# Patient Record
Sex: MALE | Race: WHITE | Employment: OTHER | URBAN - NONMETROPOLITAN AREA
[De-identification: names, ages, dates, MRNs, and addresses within clinical notes are randomized per-mention and may not be internally consistent; named-entity substitution may affect disease eponyms.]

---

## 2024-08-26 ENCOUNTER — HOSPITAL ENCOUNTER (OUTPATIENT)
Dept: WOUND CARE | Age: 69
Discharge: HOME | End: 2024-08-26
Attending: NURSE PRACTITIONER
Payer: MEDICARE

## 2024-08-26 VITALS
OXYGEN SATURATION: 95 % | RESPIRATION RATE: 18 BRPM | SYSTOLIC BLOOD PRESSURE: 156 MMHG | HEART RATE: 96 BPM | TEMPERATURE: 98 F | DIASTOLIC BLOOD PRESSURE: 65 MMHG

## 2024-08-26 DIAGNOSIS — I87.2 LYMPHEDEMA DUE TO VENOUS INSUFFICIENCY: ICD-10-CM

## 2024-08-26 DIAGNOSIS — I89.0 LYMPHEDEMA DUE TO VENOUS INSUFFICIENCY: ICD-10-CM

## 2024-08-26 DIAGNOSIS — I83.891 VENOUS STASIS ULCER OF RIGHT LOWER LEG WITH EDEMA OF RIGHT LOWER LEG (HCC): Primary | ICD-10-CM

## 2024-08-26 DIAGNOSIS — R60.0 VENOUS STASIS ULCER OF RIGHT LOWER LEG WITH EDEMA OF RIGHT LOWER LEG (HCC): Primary | ICD-10-CM

## 2024-08-26 DIAGNOSIS — I83.019 VENOUS STASIS ULCER OF RIGHT LOWER LEG WITH EDEMA OF RIGHT LOWER LEG (HCC): Primary | ICD-10-CM

## 2024-08-26 DIAGNOSIS — L97.919 VENOUS STASIS ULCER OF RIGHT LOWER LEG WITH EDEMA OF RIGHT LOWER LEG (HCC): Primary | ICD-10-CM

## 2024-08-26 DIAGNOSIS — E11.620 TYPE 2 DIABETES MELLITUS WITH DIABETIC DERMATITIS, UNSPECIFIED WHETHER LONG TERM INSULIN USE (HCC): ICD-10-CM

## 2024-08-26 PROCEDURE — 99204 OFFICE O/P NEW MOD 45 MIN: CPT | Performed by: NURSE PRACTITIONER

## 2024-08-26 PROCEDURE — 99203 OFFICE O/P NEW LOW 30 MIN: CPT

## 2024-08-26 PROCEDURE — 29580 STRAPPING UNNA BOOT: CPT

## 2024-08-26 RX ORDER — GENTAMICIN SULFATE 1 MG/G
OINTMENT TOPICAL ONCE
OUTPATIENT
Start: 2024-08-26 | End: 2024-08-26

## 2024-08-26 RX ORDER — SILVER SULFADIAZINE 10 MG/G
CREAM TOPICAL ONCE
OUTPATIENT
Start: 2024-08-26 | End: 2024-08-26

## 2024-08-26 RX ORDER — ASPIRIN 81 MG/1
81 TABLET ORAL DAILY
COMMUNITY

## 2024-08-26 RX ORDER — LIDOCAINE 50 MG/G
OINTMENT TOPICAL ONCE
OUTPATIENT
Start: 2024-08-26 | End: 2024-08-26

## 2024-08-26 RX ORDER — TRIAMCINOLONE ACETONIDE 1 MG/G
OINTMENT TOPICAL ONCE
OUTPATIENT
Start: 2024-08-26 | End: 2024-08-26

## 2024-08-26 RX ORDER — LIDOCAINE HYDROCHLORIDE 40 MG/ML
SOLUTION TOPICAL ONCE
OUTPATIENT
Start: 2024-08-26 | End: 2024-08-26

## 2024-08-26 RX ORDER — BACITRACIN ZINC AND POLYMYXIN B SULFATE 500; 1000 [USP'U]/G; [USP'U]/G
OINTMENT TOPICAL ONCE
OUTPATIENT
Start: 2024-08-26 | End: 2024-08-26

## 2024-08-26 RX ORDER — LIDOCAINE HYDROCHLORIDE 20 MG/ML
JELLY TOPICAL ONCE
OUTPATIENT
Start: 2024-08-26 | End: 2024-08-26

## 2024-08-26 RX ORDER — LIDOCAINE 40 MG/G
CREAM TOPICAL ONCE
OUTPATIENT
Start: 2024-08-26 | End: 2024-08-26

## 2024-08-26 RX ORDER — SODIUM CHLOR/HYPOCHLOROUS ACID 0.033 %
SOLUTION, IRRIGATION IRRIGATION ONCE
OUTPATIENT
Start: 2024-08-26 | End: 2024-08-26

## 2024-08-26 RX ORDER — GINSENG 100 MG
CAPSULE ORAL ONCE
OUTPATIENT
Start: 2024-08-26 | End: 2024-08-26

## 2024-08-26 RX ORDER — CIPROFLOXACIN 500 MG/1
500 TABLET, FILM COATED ORAL 2 TIMES DAILY
COMMUNITY

## 2024-08-26 RX ORDER — CLOBETASOL PROPIONATE 0.5 MG/G
OINTMENT TOPICAL ONCE
OUTPATIENT
Start: 2024-08-26 | End: 2024-08-26

## 2024-08-26 RX ORDER — KRILL/OM-3/DHA/EPA/PHOSPHO/AST 500-110 MG
500 CAPSULE ORAL DAILY
COMMUNITY

## 2024-08-26 RX ORDER — M-VIT,TX,IRON,MINS/CALC/FOLIC 27MG-0.4MG
1 TABLET ORAL DAILY
COMMUNITY

## 2024-08-26 RX ORDER — PIOGLITAZONEHYDROCHLORIDE 45 MG/1
45 TABLET ORAL DAILY
COMMUNITY

## 2024-08-26 RX ORDER — ATORVASTATIN CALCIUM 80 MG/1
80 TABLET, FILM COATED ORAL DAILY
COMMUNITY

## 2024-08-26 RX ORDER — LISINOPRIL AND HYDROCHLOROTHIAZIDE 12.5; 2 MG/1; MG/1
2 TABLET ORAL DAILY
COMMUNITY

## 2024-08-26 RX ORDER — BETAMETHASONE DIPROPIONATE 0.5 MG/G
CREAM TOPICAL ONCE
OUTPATIENT
Start: 2024-08-26 | End: 2024-08-26

## 2024-08-26 RX ORDER — MUPIROCIN 20 MG/G
OINTMENT TOPICAL ONCE
OUTPATIENT
Start: 2024-08-26 | End: 2024-08-26

## 2024-08-26 RX ORDER — EZETIMIBE 10 MG/1
10 TABLET ORAL DAILY
COMMUNITY

## 2024-08-26 RX ORDER — NEOMYCIN/BACITRACIN/POLYMYXINB 3.5-400-5K
OINTMENT (GRAM) TOPICAL ONCE
OUTPATIENT
Start: 2024-08-26 | End: 2024-08-26

## 2024-08-26 RX ORDER — INSULIN GLARGINE 100 [IU]/ML
45 INJECTION, SOLUTION SUBCUTANEOUS 2 TIMES DAILY
COMMUNITY

## 2024-08-26 NOTE — PATIENT INSTRUCTIONS
Visit Discharge/Physician Orders:    Wound Location: Right leg     Dressing orders:     1) Gather wound care supplies and arrange on clean table.     2) Wash your hands with soap and water or use alcohol based hand  for 20 seconds (sing \"Happy Birthday\" twice).    3) Cleanse wounds with normal saline or wound cleanser and gauze. Pat dry with clean gauze.    4) Right leg- Remove old unna boots to right lower legs. Wash legs with warm soapy water. Pat dry. Cleanse wound with normal saline or wound cleanser and gauze. Pat dry with clean gauze.  Apply unna boots ABD then kerlix then coban to right lower legs from base of toes to 1-2 inches below bend of knee. Change in wound clinic weekly.     Keep all dressings clean & dry.    Follow up visit:   1 Week on Thursday September 5th at 2:00 pm     Keep next scheduled appointment. Please give 24 hour notice if unable to keep appointment. [phone]    If you experience any of the following, please call the Wound Care Service during business hours: Monday through Friday 8:00 am - 4:30 pm  ([phone]).   *Increase in pain   *Temperature over 101   *Increase in drainage from your wound or a foul odor   *Uncontrolled swelling   *Need for compression bandage changes due to slippage, breakthrough drainage    If you need medical attention outside of business hours, please contact your Primary Care Doctor or go to the nearest emergency room.

## 2024-08-26 NOTE — PROGRESS NOTES
Unna Boot Application   Below Knee    NAME:  Mendel Cedeno  YOB: 1955  MEDICAL RECORD NUMBER:  [id number]  DATE:  8/26/2024    Unna boot: Appied primary and secondary dressing as ordered.  Applied Unna roll from toes to knee overlapping each time.   Applied ace wrap or coban from toes to below the knee.   Secured with tape and/or metal clips covered with tape.   Instructed patient/caregiver to keep dressing dry and intact. DO NOT REMOVE DRESSING.   Instructed pt/family/caregiver to report excessive draining, loose bandage, wet dressing, severe pain or tingling in toes.  Applied Unna Boot dressing below the knee to right lower leg.    Unna Boot(s) were applied per  Guidelines.     Electronically signed by Gloria Proctor RN on 8/26/2024 at 3:34 PM

## 2024-08-26 NOTE — PLAN OF CARE
Problem: Wound:  Goal: Will show signs of wound healing; wound closure and no evidence of infection  Description: Will show signs of wound healing; wound closure and no evidence of infection  Outcome: Progressing     Patient presents to wound clinic for right leg wound. No s/s of infection noted. See AVS for discharge instructions. Follow up visit:   1 Week on Thursday September 5th at 2:00 pm     Care plan reviewed with patient and wife.  Patient and wife  verbalize understanding of the plan of care and contribute to goal setting.

## 2024-08-26 NOTE — PROGRESS NOTES
[facility]  Consult and Procedure Note      Mendel Cedeno  MEDICAL RECORD NUMBER:  [id number]  AGE: 69 y.o.   GENDER: male  : 1955  EPISODE DATE:  2024  Referring Provider: NELSON Shin - *   Reason for Referral: right leg wound    SUBJECTIVE:     Chief Complaint   Patient presents with    Wound Check     Right leg          HISTORY OF PRESENT ILLNESS      Mendel Cedeno is a 69 y.o. male who presents today for wound/ulcer evaluation. Patient is new to the wound center. Wound duration:  3 week(s).    Patient presents today for evaluation of right lower extremity wound.  Patient states that wound began approximately 3 weeks ago after he had been on vacation camping.  He states that wounds initially presented as blisters that then opened into draining wounds.  He has been following with PCP for this problem who has treated him with Doxycycline and Ciprofloxacin. He states that erythema and drainage have decreased with this. Edema and wounds continue.  He denies any adverse effects to use of Cipro which he continues on today.  He states that he was recently placed on diuretic which has helped with some of his edema.  He has never previously worn any type of compression stocking but states that his PCP has talked to him regarding the need for this.  He denies any further needs or concerns.    PAST MEDICAL HISTORY             Diagnosis Date    Diabetes mellitus (HCC)     Hypertension     Peripheral vascular disease (HCC)        PAST SURGICAL HISTORY     History reviewed. No pertinent surgical history.    FAMILY HISTORY     History reviewed. No pertinent family history.    SOCIAL HISTORY     Social History     Tobacco Use    Smoking status: Former     Types: Cigarettes   Substance Use Topics    Alcohol use: Not Currently    Drug use: Never       ALLERGIES     No Known Allergies    MEDICATIONS     Current Outpatient Medications on File Prior to Encounter   Medication Sig Dispense  visit on today's date.       Wounds and Treatment Plan:    Assessment & Plan  Venous stasis ulcer of right lower leg with edema of right lower leg (HCC)   Etiology of venous disease discussed with patient at today's visit.  Reviewed the importance of lifelong control of edema through compression, elevation of legs while at rest, and regular exercise in both healing of current wounds and prevention of reoccurrence of wounds in the future. Complications of nonhealing/not treated wounds include but are not limited to infection, pain, infection of bone, sepsis, loss of life and limb.      -Start use of unna boot to RLE to promote healing of wound as well as to provide good compression.  Advised patient to leave in place until follow up next week.  Do not get dressing wet.  Should dressing become wet gently remove and call clinic.      Type 2 diabetes mellitus with diabetic dermatitis, unspecified whether long term insulin use ([facility])  Education provided on the importance of good control of diabetes mellitus in wound healing and resolution/prevention of infection.  Last hemoglobin A1C unknown.  Encouraged patient to take medications as prescribed for good control. Recommend well balanced diet with adequate protein intake within any limits set by previous providers.        Lymphedema due to venous insufficiency  Etiology of lymphedema discussed with patient at today's visit.  Reviewed the importance of lifelong control of edema through daily use of compression, elevation of legs while at rest, and regular exercise in both healing of current wounds and prevention of reoccurrence of wounds in the future. Reviewed various forms of compression available.  Recommend obtaining compression stockings for long-term use.       Dressing application process discussed and demonstrated during visit today, written instructions provided.  Patient verbalized comfort with process.  All questions and concerns addressed prior to completion of

## 2024-09-05 ENCOUNTER — HOSPITAL ENCOUNTER (OUTPATIENT)
Dept: WOUND CARE | Age: 69
Discharge: HOME | End: 2024-09-05
Attending: NURSE PRACTITIONER
Payer: MEDICARE

## 2024-09-05 VITALS
SYSTOLIC BLOOD PRESSURE: 149 MMHG | DIASTOLIC BLOOD PRESSURE: 70 MMHG | HEART RATE: 96 BPM | RESPIRATION RATE: 18 BRPM | OXYGEN SATURATION: 97 % | TEMPERATURE: 98.3 F

## 2024-09-05 DIAGNOSIS — I87.2 LYMPHEDEMA DUE TO VENOUS INSUFFICIENCY: ICD-10-CM

## 2024-09-05 DIAGNOSIS — I83.019 VENOUS STASIS ULCER OF RIGHT LOWER LEG WITH EDEMA OF RIGHT LOWER LEG (HCC): Primary | ICD-10-CM

## 2024-09-05 DIAGNOSIS — R60.0 VENOUS STASIS ULCER OF RIGHT LOWER LEG WITH EDEMA OF RIGHT LOWER LEG (HCC): Primary | ICD-10-CM

## 2024-09-05 DIAGNOSIS — I83.891 VENOUS STASIS ULCER OF RIGHT LOWER LEG WITH EDEMA OF RIGHT LOWER LEG (HCC): Primary | ICD-10-CM

## 2024-09-05 DIAGNOSIS — L97.919 VENOUS STASIS ULCER OF RIGHT LOWER LEG WITH EDEMA OF RIGHT LOWER LEG (HCC): Primary | ICD-10-CM

## 2024-09-05 DIAGNOSIS — I89.0 LYMPHEDEMA DUE TO VENOUS INSUFFICIENCY: ICD-10-CM

## 2024-09-05 PROCEDURE — 99213 OFFICE O/P EST LOW 20 MIN: CPT | Performed by: NURSE PRACTITIONER

## 2024-09-05 PROCEDURE — 99212 OFFICE O/P EST SF 10 MIN: CPT

## 2024-09-05 NOTE — ASSESSMENT & PLAN NOTE
-Wound has healed.  Discontinue previous wound care orders.    -Education provided on the importance of long term use of compression to prevent recurrence.  Apply in the morning, remove prior to bed.  Patient declined application of tubigrip today.

## 2024-09-05 NOTE — PATIENT INSTRUCTIONS
Visit Discharge/Physician Orders:  - Measurements as follows: ankle: calf: leg length: 44cm   Wound Location: Right leg - healed     Keep all dressings clean & dry.    Follow up visit:  As needed. Call with any concerns.     Keep next scheduled appointment. Please give 24 hour notice if unable to keep appointment. [phone]    If you experience any of the following, please call the Wound Care Service during business hours: Monday through Friday 8:00 am - 4:30 pm  ([phone]).   *Increase in pain   *Temperature over 101   *Increase in drainage from your wound or a foul odor   *Uncontrolled swelling   *Need for compression bandage changes due to slippage, breakthrough drainage    If you need medical attention outside of business hours, please contact your Primary Care Doctor or go to the nearest emergency room.

## 2024-09-05 NOTE — PLAN OF CARE
Problem: Wound:  Goal: Will show signs of wound healing; wound closure and no evidence of infection  Description: Will show signs of wound healing; wound closure and no evidence of infection  Outcome: Completed     Patient presents to wound clinic for right leg wound. No s/s of infection noted. See AVS for discharge instructions. Follow up as needed.    Care plan reviewed with patient.  Patient verbalize understanding of the plan of care and contribute to goal setting.

## 2024-09-05 NOTE — PROGRESS NOTES
Lymphedema due to venous insufficiency    -Right leg edema significantly improved today with use of compression wrap, left leg edema continues with no compression.  Etiology of lymphedema discussed with patient at today's visit.  Reviewed the importance of lifelong control of edema through daily use of compression, elevation of legs while at rest, and regular exercise in both healing of current wounds and prevention of reoccurrence of wounds in the future. Reviewed various forms of compression available.  Recommend obtaining compression stockings for long-term use.      Follow up as needed.  Discharge patient from wound clinic today due to complete healing of wound.  Discontinue all previously ordered wound care.  Call clinic for any further needs or concerns.    Follow up 1 week.  Call clinic with any needs or concerns prior to scheduled visit.    All questions and concerns addressed prior to discharge from today's visit.    Please see attached Discharge Instructions    Written patient dismissal instructions given to patient or POA.         I spent a total of 20 minutes with Mendel Cedeno  on 9/5/2024.  Time spent includes review of previous progress notes, reviewing and discussing test results, education on plan of care for presenting diagnosis, answering questions, providing instructions on treatment and/or medications ordered, reviewing importance of compliance with outline treatment plan and any identifiable barriers to compliance and coordination of care based on plan and assessment as noted.     Discharge Instructions       Patient Instructions   Visit Discharge/Physician Orders:  - Measurements as follows: ankle: 24cm  calf: 42 cm leg length: 44cm     Wound Location: Right leg - healed     Keep all dressings clean & dry.    Follow up visit:  As needed. Call with any concerns.     Keep next scheduled appointment. Please give 24 hour notice if unable to keep appointment. [phone]    If you experience